# Patient Record
Sex: FEMALE | Race: WHITE | NOT HISPANIC OR LATINO | URBAN - METROPOLITAN AREA
[De-identification: names, ages, dates, MRNs, and addresses within clinical notes are randomized per-mention and may not be internally consistent; named-entity substitution may affect disease eponyms.]

---

## 2017-02-23 ENCOUNTER — COMMUNICATION - HEALTHEAST (OUTPATIENT)
Dept: ALLERGY | Facility: CLINIC | Age: 37
End: 2017-02-23

## 2017-02-23 DIAGNOSIS — J45.901 ASTHMA ATTACK: ICD-10-CM

## 2017-03-24 ENCOUNTER — COMMUNICATION - HEALTHEAST (OUTPATIENT)
Dept: ALLERGY | Facility: CLINIC | Age: 37
End: 2017-03-24

## 2017-03-24 DIAGNOSIS — J45.901 ASTHMA ATTACK: ICD-10-CM

## 2017-06-03 ENCOUNTER — HEALTH MAINTENANCE LETTER (OUTPATIENT)
Age: 37
End: 2017-06-03

## 2017-06-05 ENCOUNTER — OFFICE VISIT - HEALTHEAST (OUTPATIENT)
Dept: ALLERGY | Facility: CLINIC | Age: 37
End: 2017-06-05

## 2017-06-05 DIAGNOSIS — J45.40 MODERATE PERSISTENT ASTHMA, UNCOMPLICATED: ICD-10-CM

## 2017-09-23 ENCOUNTER — COMMUNICATION - HEALTHEAST (OUTPATIENT)
Dept: ALLERGY | Facility: CLINIC | Age: 37
End: 2017-09-23

## 2017-09-23 DIAGNOSIS — J45.901 ASTHMA ATTACK: ICD-10-CM

## 2017-11-14 ENCOUNTER — COMMUNICATION - HEALTHEAST (OUTPATIENT)
Dept: ALLERGY | Facility: CLINIC | Age: 37
End: 2017-11-14

## 2017-11-14 DIAGNOSIS — J45.909 ASTHMA: ICD-10-CM

## 2018-01-18 ENCOUNTER — RECORDS - HEALTHEAST (OUTPATIENT)
Dept: ADMINISTRATIVE | Facility: OTHER | Age: 38
End: 2018-01-18

## 2018-01-18 ENCOUNTER — COMMUNICATION - HEALTHEAST (OUTPATIENT)
Dept: ALLERGY | Facility: CLINIC | Age: 38
End: 2018-01-18

## 2018-06-13 ENCOUNTER — OFFICE VISIT - HEALTHEAST (OUTPATIENT)
Dept: ALLERGY | Facility: CLINIC | Age: 38
End: 2018-06-13

## 2018-06-13 DIAGNOSIS — J45.40 MODERATE PERSISTENT ASTHMA WITHOUT COMPLICATION: ICD-10-CM

## 2018-06-13 DIAGNOSIS — J30.9 ALLERGIC RHINOCONJUNCTIVITIS: ICD-10-CM

## 2018-06-13 DIAGNOSIS — H10.10 ALLERGIC RHINOCONJUNCTIVITIS: ICD-10-CM

## 2018-06-13 DIAGNOSIS — J45.40 MODERATE PERSISTENT ASTHMA, UNCOMPLICATED: ICD-10-CM

## 2018-08-10 ENCOUNTER — COMMUNICATION - HEALTHEAST (OUTPATIENT)
Dept: ALLERGY | Facility: CLINIC | Age: 38
End: 2018-08-10

## 2018-08-10 DIAGNOSIS — J45.40 MODERATE PERSISTENT ASTHMA, UNCOMPLICATED: ICD-10-CM

## 2019-02-09 ENCOUNTER — COMMUNICATION - HEALTHEAST (OUTPATIENT)
Dept: ALLERGY | Facility: CLINIC | Age: 39
End: 2019-02-09

## 2019-02-09 DIAGNOSIS — J45.40 MODERATE PERSISTENT ASTHMA, UNCOMPLICATED: ICD-10-CM

## 2019-05-22 ENCOUNTER — COMMUNICATION - HEALTHEAST (OUTPATIENT)
Dept: ALLERGY | Facility: CLINIC | Age: 39
End: 2019-05-22

## 2019-08-31 ENCOUNTER — COMMUNICATION - HEALTHEAST (OUTPATIENT)
Dept: ALLERGY | Facility: CLINIC | Age: 39
End: 2019-08-31

## 2019-08-31 DIAGNOSIS — J45.40 MODERATE PERSISTENT ASTHMA WITHOUT COMPLICATION: ICD-10-CM

## 2021-06-11 NOTE — PROGRESS NOTES
Assessment:    Persistent asthma that is well controlled on Dulera     Plan:  Dulera 100-2 puffs twice a day  Albuterol 2 puffs every 4 hours as needed    Return in 12 months for follow-up. Plan on spirometry and nitric oxide at that visit.  ____________________________________________________________________________     Patient comes in today for routine follow-up of asthma.  She was last seen summer 2016.  Since that time she has done well.  She is using her Dulera 2 puffs twice daily.  She rarely uses albuterol.  Asthma control test of 24.    Physical Exam:  General:  Alert and Oriented X 3.  Eyes:  Sclera clear. Nose: pale boggy mucosal membranes.  Throat:  pink moist, no lesions.  Lungs:  clear to auscultation    This transcription uses voice recognition software, which may contain typographical errors.

## 2021-06-18 NOTE — PROGRESS NOTES
Assessment:    Persistent asthma.  Good symptomatic control.  Somewhat of an obstructive pattern on spirometry and elevated nitric oxide.  Likely this is due to the gap in controller medication use.      Plan:  Dulera 100-2 puffs twice a day  Albuterol 2 puffs every 4 hours as needed  Antihistamines such as cetirizine for treatment of allergy symptoms.     Return in 12 months for follow-up.  Sooner if poor control.  Recommend spirometry/nitric oxide in 12 months.  ____________________________________________________________________________     CC comes in today for annual follow-up of asthma.  She reports over the past year her asthma is been under excellent control.  She has been using Dulera 100-2 puffs twice a day.  She ran out several weeks ago.  Her primary physician prescribed a different controller.  She describes a disc like inhaler but is unsure what the name of the medicine is.  She just recently started it.  She does use Zyrtec 20 mg daily.  This works well for managing her environmental allergy symptoms.    Physical Exam:  General:  Alert and Oriented.  Eyes:  Sclera clear. Nose: pale boggy mucosal membranes.  Throat:  pink moist, no lesions.  Lungs:  clear to auscultation. Skin:  no rashes    Spirometry: FEV1 to FVC ratio 74%.  FEV1 is 3.12 L which is 90% of predicted.  FVC is 4.24 L 100% of predicted.  This is normal spirometry    Nitric oxide is elevated at 73 ppb

## 2021-07-03 NOTE — ADDENDUM NOTE
Addendum Note by Holden Olson MD at 6/19/2018  1:09 PM     Author: Holden Olson MD Service: -- Author Type: Physician    Filed: 6/19/2018  1:09 PM Encounter Date: 6/13/2018 Status: Signed    : Holden Olson MD (Physician)    Addended by: HOLDEN OLSON on: 6/19/2018 01:09 PM        Modules accepted: Orders

## 2021-08-29 ENCOUNTER — HEALTH MAINTENANCE LETTER (OUTPATIENT)
Age: 41
End: 2021-08-29

## 2021-10-24 ENCOUNTER — HEALTH MAINTENANCE LETTER (OUTPATIENT)
Age: 41
End: 2021-10-24

## 2022-10-15 ENCOUNTER — HEALTH MAINTENANCE LETTER (OUTPATIENT)
Age: 42
End: 2022-10-15

## 2023-10-29 ENCOUNTER — HEALTH MAINTENANCE LETTER (OUTPATIENT)
Age: 43
End: 2023-10-29

## 2024-03-17 ENCOUNTER — HEALTH MAINTENANCE LETTER (OUTPATIENT)
Age: 44
End: 2024-03-17